# Patient Record
Sex: MALE | Race: WHITE
[De-identification: names, ages, dates, MRNs, and addresses within clinical notes are randomized per-mention and may not be internally consistent; named-entity substitution may affect disease eponyms.]

---

## 2020-08-27 NOTE — NUR
Patient arrived to [facility] this afternoon and had his initial assessment.  Patient
was very talkative.  He was aclamated to the IPR unit.  Patient is forgetful
at times.  PT asked to have ST orders placed for cognition issues.  He is
independent with his meals.  He was a CGA with all transfers using gait belt
and walker.  He was able to wipe himself and pull pants up and down
independently with Touch assistance only.  His bottom is a little red.
Patient needed Mod assist to get legs in the bed.  He is independent with
rolling side to side and sit to lying and lying to sitting.  Patient wanted
brief removed at  so that he could sleep better.  He is A&Ox4 and had a pain
level of 0-4/10 this shift.

## 2020-08-27 NOTE — NUR
Call placed to Engineering about temperture in patient room.  Patient
reporting that temp is too cold.  Awaiting a return call.

## 2020-08-28 NOTE — NUR
BED ALARM SOUNDING.  PT AMB TO BR PER SELF W/ WALKER. ENC PT TO CALL FOR
ASSIST.  RELATED HAD A BAD NIGHT.  PT FEELS CONSTIPATED. WANTED ONLY PRUNE
JUICE.  HAVING LT HIP PAIN. SEE MAR FOR PAIN MED GIVEN.  PT RELATED HAD
URINATED ON FEET. CLEANED FEET AND PLACED NEW SOCKS ON.  PT APPRECIATIVE FOR
ASSIST. CALL LIGHT IN REACH. BED ALARM SET.

## 2020-08-28 NOTE — NUR
Patient sitting up in the recliner eatting breakfast. A&Ox3. VSS. Denies pain
and discomfort. Nursing staff assisted patient prior to the bathroom. Gait
belt and walker used, patient steady on feet and tolerated well. Patient
independent with breakfast. No further needs expressed from the patient. Call
light within reach. Chair alarm on

## 2020-08-28 NOTE — NUR
met with patient, patient's wife Cindy (ph#[phone]) and
patient's daughter, Virginia (ph#[phone]) as patient is new to [facility].
Patient's daughter is here from [place]. Patient [place], [place] with his wife and sees Dr. Yen for primary care. Patient had a hip
replacement on 08/10/20 at the [facility] and was discharged
home. Patient has been using a four wheeled walker and feels that this is why
he has been falling at home. Patient does not have a standard front wheeled
walker. Patient is normally independent with ADLS. Patient does not have
Advance Directives in EMR. SW will continue to follow.

## 2020-08-29 NOTE — NUR
Patient resting in recliner, call light in reach and bed alarm set.  Denies
pain at this time.  Visiting with daughter on the phone at this time.  Will
continue to monitor.

## 2020-08-29 NOTE — NUR
Patient attended group therapy this morning.  He is independent with eating.
He is currently resting in recliner with wife and daughter by his side.
Patient given prn pain meds for leg pain and has been effective.  Dressing to
left leg is CDI.

## 2020-08-29 NOTE — NUR
PATIENT REPORTS FEELING RESTLESS AT THIS TIME, SITTING AT SIDE OF BED, UNABLE
TO SLEEP, REMINDED PATIENT OF FALL PRECAUTIONS, NEEDING TO HAVE ASST W/GB&WW
WHEN OUT OF BED. DENIES ANY NEEDS OR CONCERNS. syncope

## 2020-08-29 NOTE — NUR
PATIENT WITH STEADY GAIT WHEN WALKING TO AND FROM BATHROOM WITH ASSIST
X1/GB&WW IN PLACE. FALL PRECAUTIONS IN PLACE AND PATIENT VERBALIZED
UNDERSTANDING OF GB & WW USE WHEN OUT OF BED AND ACCOMPANIED BY STAFF X1. BED
ALARM ON WHEN IN BED.

## 2020-08-29 NOTE — NUR
PATIENT AMBULATING FROM BATHROOM TO BED DURING CHANGE OF SHIFT REPORT FROM DAY
SHIFT NURSE. BED ALARM ON WHEN BACK IN BED.

## 2020-08-29 NOTE — NUR
Family just arrived and visiting with patient.  Patient denies pain at this
time.  Takes pills whole with water.  Currently sitting in recliner, call
light in reach and chair alarm set.  Will continue to monitor.

## 2020-08-30 NOTE — NUR
PATIENT WITH SOME DECREASED ROM/STRENGTH TO LLE D/T HIP SURGERY. DENIES
NUMBNESS/TINGLING TO LLE AT THIS TIME. DENIES CHEST PAIN, SHORTNESS OF BREATH
AT THIS TIME. CHAIR ALARM ON WHEN SITTING UP IN CHAIR CURRENTLY.

## 2020-08-30 NOTE — NUR
PATIENT C/O ABDOMINAL PAIN THIS AM WHICH SUBSIDED AND RETURNED IN THE EARLY
AFTERNOON. AFTER A LATE LUNCH THE PATIENT AMBULATED THE HALLWAYS WITH THIS
NURSE. AT THIS TIME THE PATIENT RECEIVED A PRN TRAMADOL AND UTILIZED ICE
THERAPY ON THE HIP. THE PATIENT AMBULATED THE HALLWAY AGAIN LATER. IN THE
EVENING THE PATIENT REPORTED GAGGING ON PHLEGM BUT NO MORE ABDOMINLA PAIN.

## 2020-08-30 NOTE — NUR
PATIENT STATED HE WALKED IN HALLS WITH DAY SHIFT NURSES A TOTAL OF 2 TIMES,
ABOUT SAME DISTANCE HE WALKED CURRENTLY WITH THIS NURSE. UP IN CHAIR, CHAIR
ALARM ON.

## 2020-08-30 NOTE — NUR
PATIENT SLEEPING IN CHAIR AT BEDSIDE SHIFT REPORT. CHAIR ALARM IN PLACE, CALL
LIGHT WITHIN REACH. PATIENT REQUESTS A BATH AND SHAVE TODAY.

## 2020-08-30 NOTE — NUR
PATIENT C/O ABDOMINAL PAIN, DENIES FEELING OF NAUSEA, DOES FEEL CONSTIPATED.
TUMS WAS GIVEN THIS AM. PATIENT DENIED MORE PAIN UNTIL THE AFTERNOON.
CONTINUING TO MONITOR.

## 2020-08-30 NOTE — NUR
RECEIVED CHANGE OF SHIFT REPORT FROM DAY SHIFT NURSE, WITH PATIENT SITTING UP
IN CHAIR. DENIES ANY NEEDS OR CONCERNS AT TIME OF REPORT.

## 2020-08-30 NOTE — NUR
CHANGE OF SHIFT REPORT GIVEN TO DAY SHIFT NURSEMIRNA. CHAIR ALARM ON WHILE
PATIENT SLEEPING IN CHAIR DURING REPORT.

## 2020-08-30 NOTE — NUR
PATIENT AMBULATED 300 FEET TODAY IN TWO DIFFERNT SESSIONS WITH THIS NURSE.
GAIT BELT AND WALKER UTILIZED. PATIENT PUTS A LOT OF WEIGHT ON WRIST/HANDS
LEANING ON THE WALKER FOR SUPPORT.

## 2020-08-31 NOTE — NUR
PATIENT RESTING IN CHAIR AT BEDSIDE SHIFT REPORT. PATIENT'S CLOTHES BROUGHT UP
FROM DRYER. CHAIR ALARM ON AND CALL LIGHT WITHIN REACH.

## 2020-08-31 NOTE — NUR
PRN SUPPOSITORY GIVEN TODAY FOR CONSTIPATION. PRN MIRALAX GIVEN WITH PRUNE
JUICE YESTERDAY AND PATIENT RECEIVES SCHEDULED COLACE. PATIENT HAD SMALL HARD
STOOL TODAY AND C/O NEEDING TO GO MORE.

## 2020-08-31 NOTE — NUR
PATIENT SHOWERED WITH OT, THE AIRSTRIP NEEDED CHANGED AND HAD MOD AMOUNT OF
SANGUINEOUS DRAINAGE NOTED. A SINGLE SUTURE CAME OUT ON POSTERIOR END OF
INCISION AND INCISION DOES NOT APPROXIMATE AT THAT SITE.

## 2020-08-31 NOTE — NUR
PATIENT WITH DECREASED STRENGTH TO LLE D/T RECENT HIP SURGERY AND PAIN. DENIES
CHEST PAIN, SHORTNESS OF BREATHE, NUMBNESS/TINGLING TO EXTREMITIES. CHAIR
ALARM ON WHEN UP IN CHAIR, BED ALARM ON WHEN IN BED.

## 2020-08-31 NOTE — NUR
PATIENT DROPPED ON THE FLOOR EARLY AM SCHEDULED MEDS OF PROTONIX, IMDUR AND
THYROID MEDS, AFTER PATIENT PLACED PILLS FROM PILL CUP INTO HIS HAND. REMOVED
SAME THREE MEDS FROM MED STATION. PATIENT UP IN CHAIR WITH CHAIR ALARM ON.

## 2020-08-31 NOTE — NUR
PATIENT HAD SMALL FORMED SOFT BLACK/TARRY BM AFTER SUPPOSITORY THIS DANISHA. FEELS
LIKE HE STILL HAS MORE. GIVING PATIENT A BREAK AND WILL TRY OTHER
INTERVENTIONS IF NECESSARY. PATIENT'S PAIN MANAGED WELL DURING THERAPY TODAY.
PATIENT RESTING IN CHAIR WITH ALARM IN PLACE AND ON AND CALL LIGHT WITHIN
REACH.

## 2020-08-31 NOTE — NUR
KOLBY met with the patient and his daughter, Virginia, to introduce oneself and
to follow up. The patient reports that he is okay. KOLBY discussed having a
patient/family meeting this Wednesday. Laly contacted the patient's wife,
Cindy, to see if this would work. Cindy reports that she has appointments
all day on Wednesday and that Thursday would work better for her. A
patient/family meeting was scheduled for Thursday at 1315. KOLBY to inform IPR
Director.

## 2020-09-01 NOTE — NUR
Patient was seen by Dr. Leach today, see new orders for Melatonin to help him
with sleeping.  Patient's wife and daughter stopped by to visit with him
earlier today.

## 2020-09-01 NOTE — NUR
Patient attended group therapy and is now resting in recliner, call light in
reach and chair alarm set.  Patient reported that he had a large bowel
movement following Group Therapy.  Patient had received some miralax this
morning and had some warm water with prunes with breakfast.  Patient reporting
that he is feeling more comfortable now.

## 2020-09-01 NOTE — NUR
Admission QIM scores were reviewed by the team.  Code of 2 chosen for lower
body dressing was determined by team discussion to be the most usual
performance for this patient during the assessment period.--Kiersten Brown, PD

## 2020-09-01 NOTE — NUR
CHANGE OF SHIFT REPORT RECEIVED FROM DAY SHIFT NURSE. PATIENT UP IN CHAIR
DURING REPORT. CHAIR ALARM ON.

## 2020-09-01 NOTE — NUR
KOLBY contacted the patient's daughter, Virginia, to review how only one visitor
will be allowed for the patient/family meeting or how the team can contact
both her and her mother for a conference call. Virginia reports that she would
like to speak to her mother and the patient about this, before deciding on how
to do the meeting. She reports she will contact KOLBY tomorrow morning. KOLBY to
continue to follow.

## 2020-09-02 NOTE — NUR
KOLBY met with the patient to present and review the IPR Team Conference Note. KOLBY
reviewed the patient's progress and the team's recommendation for d/c this
Friday, 9/4, with home health PT/OT vs outpatient PT. The patient is agreeable
to the plan. He states that he would prefer outpatient PT, if he is able to
drive. KOLBY then contacted and reviewed the above information with the patient's
daughter (Virginia) and wife Cindy). Virginia and Cindy were agreeable to
the plan. Virginia plans to bring Cindy up to the hospital tomorrow for the
family meeting and asks that we call her, so she be apart of the meeting. SW
to continue to follow.

## 2020-09-02 NOTE — NUR
Received report from NAZ Walter. Pt is currently sitting up in his chair. Pt
has his call light wtihin reach and his bed is in lowest position.

## 2020-09-02 NOTE — NUR
Patient currently working with therapy.  Tolerated diet well this AM.  Denies
questions this morning.

## 2020-09-02 NOTE — NUR
Pt ambulated around the surgical nurse station and back to [facility] and to his
room. Pt tolerated the walk very well. Pt had no complaints of pain. Pt is
currently back in his chair and has his call light within reach. He did
request that he had something for pain whenever he got his night time
medications.

## 2020-09-03 NOTE — NUR
Pt ambulated around the surgical nurse station and back to his room. Pt
tolerated well. Pt did stated that he was having some pain so pain was given
his pain medication with his night time medications. Pt ate a few crackers so
that he wouldn't take his medication on an empty stomach. Pt lung sounds were
clear and heart sound was normal S1 and S2 sounds. Pt has his call light
within reach. He is independent in his room has no other concerns at this
time. Pt was given fresh ice water and sugar free hot chocolate as requested.

## 2020-09-03 NOTE — NUR
PATIENT INDEPENDENT IN THE ROOM THROUGHOUT MY SHIFT. PATIENT GIVEN PAIN
MEDICATION FOR LEFT HIP PAIN AS NEEDED DURING THE DAY. WILL REPORT OFF TO
ONCOMING NURSE.

## 2020-09-03 NOTE — NUR
PATIENT TOOK SHOWER WITH OT. AIRSTRIP DRESSING REMOVED. SCANT OLD BLOODY
DRAINAGE PRESENT ON DRESSING TO PROXIMAL AND DISTAL ENDS OF THE INCISION.
EDGES WELL APPROXIMATED. STERI-STRIPS IN PLACE TO PORTIONS OF INCISION.
REDNESS AND EDEMA TO LEFT HIP SURROUNDING INCISION SITE. NEW AIRSTRIP DRESSING
APPLIED OVER LEFT HIP INCISION. PATIENT TOLERATED WELL.

## 2020-09-03 NOTE — NUR
KOLBY attended a family meeting with patient, his wife, and daughter (Virginia).
Also present was IPR Director, PT, OT, and ST. IPR Director started by
explaining the purpose of the meeting. PT/OT/ST discussed the patient's
progress and their recommendation for a d/c tomorrow with home health PT/OT vs
outpatient PT. The patient's daughter reports that the patient was cleared to
drive and that she can also accompany the patient to the appointments. The
patient reports that he would prefer outpatient PT and that he has already
spoken to [facility] and has an outpatient PT appointment.
KOLBY contacted [facility] at [facility] and confirmed the patient
has an outpatient PT appointment scheduled on 9/8 at 1020. KOLBY to inform the
patient's RN. The team answered all of patient and families questions.
 
AGATA delivered the patient's FWW to his room. KOLBY presented and read the IM
form outloud to the patient. The patient verbalized understanding and gave KOLBY
approval to sign the form on his behalf. KOLBY provided him with a copy. KOLBY to
continue to follow.

## 2020-09-03 NOTE — NUR
Pt awake and sitting up in bed. Pt request hot chocolate this morning and pt
was given sugar free hot chocolate this morning. Pt stated that he slept
better last night. Pt has his call light within reach and his bed is in lowest
position.

## 2020-09-04 NOTE — NUR
Pt is currently sleeping in his bed. Pt has been up a couple of times to go to
the restroom during the night. Pt also walked to his door earlier just to let
me know that he was awake. I did check on him a few times and he was reading
his bible on his phone. Pt has his call light within reach and his bed is in
lowest position.

## 2020-09-04 NOTE — NUR
PATIENT RESTING IN CHAIR AT BEDSIDE SHIFT REPORT. CALL LIGHT WITHIN REACH,
PATIENT DENIES PAIN AT THIS TIME.

## 2020-09-04 NOTE — NUR
Reported off to NAZ Lama. Pt is currently sitting up in his chair. He has
his call light within reach and has no concerns at this time.

## 2020-09-04 NOTE — NUR
Patient healthy summary, discharge summary, and home meds printed and reviewed
with patient and wife Cindy. Stressed importance of follow up appointments.
Reviewed medications. provided printed prescription for Tramadol. Call
prescriptions for Levothyroxine to [facility]. Belongings gathered
by Cna and family including watch and cellphone. Pt transported via wc by cna
and nurse and seatbelted for ride home. Patient and family denied questions.

## 2020-10-27 NOTE — NUR
3-4 specimans obtained from site, specimans put in 3cc of Sterile NS as
directed by Lab for container. bandaid over site, pt has no c/o

## 2021-02-06 NOTE — NUR
PT IN FOR IV MEDICATION. PICC WNL WITH GOOD BLOOD RETURN. AFTER MEDICATION
GIVEN PT ESCORTED TO PRIVATE VEHICLE VIA WHEELCHAIR.

## 2021-02-18 NOTE — NUR
Pt assisted out by wheelchair to entrance, then accompanied to car as he
ambulates with walker. Dressing to RAMESH remains clean, dry and intact. Pt
expresses understanding of PICC removal DC instructions.

## 2021-04-08 NOTE — NUR
No blood return today via PICC.Reported to ALLNE Anderson.Pt has apt with her
following this apt.Requested orders for cath flow after checking PICC
placement tomorrow .Daysi aware,per Daysi limon to use for IV abx.Will reassess
tomorrow.

## 2021-04-09 NOTE — NUR
Blood return observed via picc after 30 minutes instill time of cathflow.20ml
of blood aspired from PICC,flushed with NS.

## 2021-04-14 NOTE — NUR
Per pt report he seen surgeon yesterday,reports they "drained it".New dressing
observed at site.Pt to see ALLEN Anderson wound nurse tomorrow for follow up.No
neck dressing change today.

## 2021-04-29 NOTE — NUR
Pt returned after ID apt for PICC removal.Pt waited 30 minutes post picc
removal.No bleeding at site observed.Pt assisted to car via wheelchair.

## 2022-06-08 ENCOUNTER — HOSPITAL ENCOUNTER (OUTPATIENT)
Dept: HOSPITAL 19 - ZLAB.STJ | Age: 87
End: 2022-06-08
Attending: INTERNAL MEDICINE
Payer: MEDICARE

## 2022-06-08 DIAGNOSIS — E87.1: Primary | ICD-10-CM

## 2022-06-08 DIAGNOSIS — M62.81: ICD-10-CM

## 2022-06-17 ENCOUNTER — HOSPITAL ENCOUNTER (INPATIENT)
Dept: HOSPITAL 19 - COL.ER | Age: 87
LOS: 6 days | Discharge: NURSING, REHAB OR LONG-TERM CARE | DRG: 193 | End: 2022-06-23
Attending: INTERNAL MEDICINE | Admitting: INTERNAL MEDICINE
Payer: COMMERCIAL

## 2022-06-17 VITALS — BODY MASS INDEX: 27.53 KG/M2 | WEIGHT: 181.66 LBS | HEIGHT: 67.99 IN

## 2022-06-17 DIAGNOSIS — I48.91: ICD-10-CM

## 2022-06-17 DIAGNOSIS — Z95.5: ICD-10-CM

## 2022-06-17 DIAGNOSIS — K21.9: ICD-10-CM
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
## 2022-06-17 DIAGNOSIS — E11.649: ICD-10-CM

## 2022-06-17 DIAGNOSIS — E80.6: ICD-10-CM

## 2022-06-17 DIAGNOSIS — E87.2: ICD-10-CM

## 2022-06-17 DIAGNOSIS — Z95.1: ICD-10-CM

## 2022-06-17 DIAGNOSIS — Z23: ICD-10-CM

## 2022-06-17 DIAGNOSIS — Z95.0: ICD-10-CM

## 2022-06-17 DIAGNOSIS — T38.0X5A: ICD-10-CM

## 2022-06-17 DIAGNOSIS — I27.20: ICD-10-CM

## 2022-06-17 DIAGNOSIS — I21.A1: ICD-10-CM

## 2022-06-17 DIAGNOSIS — B97.89: ICD-10-CM

## 2022-06-17 DIAGNOSIS — Z87.01: ICD-10-CM

## 2022-06-17 DIAGNOSIS — Z79.82: ICD-10-CM

## 2022-06-17 DIAGNOSIS — J96.01: ICD-10-CM

## 2022-06-17 DIAGNOSIS — D72.829: ICD-10-CM

## 2022-06-17 DIAGNOSIS — I50.23: ICD-10-CM

## 2022-06-17 DIAGNOSIS — E11.65: ICD-10-CM

## 2022-06-17 DIAGNOSIS — J18.1: Primary | ICD-10-CM

## 2022-06-17 DIAGNOSIS — Z88.8: ICD-10-CM

## 2022-06-17 DIAGNOSIS — B97.19: ICD-10-CM

## 2022-06-17 DIAGNOSIS — Z79.890: ICD-10-CM

## 2022-06-17 DIAGNOSIS — Z88.2: ICD-10-CM

## 2022-06-17 DIAGNOSIS — Z20.822: ICD-10-CM

## 2022-06-17 DIAGNOSIS — I08.0: ICD-10-CM

## 2022-06-17 DIAGNOSIS — N17.9: ICD-10-CM

## 2022-06-17 DIAGNOSIS — D64.9: ICD-10-CM

## 2022-06-17 DIAGNOSIS — I25.10: ICD-10-CM

## 2022-06-17 DIAGNOSIS — I11.0: ICD-10-CM

## 2022-06-17 DIAGNOSIS — Z79.84: ICD-10-CM

## 2022-06-17 LAB
ALBUMIN SERPL-MCNC: 2.9 GM/DL (ref 3.4–4.8)
ALP SERPL-CCNC: 58 U/L (ref 40–150)
ALT SERPL-CCNC: 55 U/L (ref 0–55)
ANION GAP SERPL CALC-SCNC: 15 MMOL/L (ref 7–16)
AST SERPL-CCNC: 67 U/L (ref 5–34)
BASE EXCESS BLDA CALC-SCNC: 0 MMOL/L (ref -2–2)
BILIRUB SERPL-MCNC: 1.5 MG/DL (ref 0.2–1.2)
BUN SERPL-MCNC: 32 MG/DL (ref 8–26)
CALCIUM SERPL-MCNC: 8.7 MG/DL (ref 8.4–10.2)
CHLORIDE SERPL-SCNC: 102 MMOL/L (ref 98–107)
CO2 BLDA-SCNC: 24.5 MMOL/L
CO2 SERPL-SCNC: 21 MMOL/L (ref 23–31)
CREAT SERPL-SCNC: 0.98 MG/DL (ref 0.72–1.25)
EOSINOPHIL NFR BLD: 1 % (ref 0–4)
ERYTHROCYTE [DISTWIDTH] IN BLOOD BY AUTOMATED COUNT: 13.7 % (ref 11.5–14.5)
GLUCOSE SERPL-MCNC: 196 MG/DL (ref 70–99)
HCO3 BLDA-SCNC: 23.5 MEQ/L (ref 22–26)
HCT VFR BLD AUTO: 37.5 % (ref 42–52)
HGB BLD-MCNC: 13 G/DL (ref 13.5–18)
LIPASE SERPL-CCNC: 7 U/L (ref 8–78)
LYMPHOCYTES NFR BLD MANUAL: 6 % (ref 20–51)
MCH RBC QN AUTO: 34 PG (ref 27–31)
MCHC RBC AUTO-ENTMCNC: 35 G/DL (ref 33–37)
MCV RBC AUTO: 97 FL (ref 80–100)
METAMYELOCYTES NFR BLD MANUAL: 1 % (ref 0–0)
MONOCYTES NFR BLD: 6 % (ref 1.7–9.3)
NEUTS BAND NFR BLD: 4 % (ref 0–10)
NEUTS SEG NFR BLD MANUAL: 82 % (ref 42–75.2)
PCO2 BLDA: 34.3 MMHG (ref 35–45)
PLATELET # BLD AUTO: 135 K/MM3 (ref 130–400)
PLATELET BLD QL SMEAR: NORMAL
PMV BLD AUTO: 10.5 FL (ref 7.4–10.4)
PO2 BLDA: 79.3 MMHG (ref 80–100)
POTASSIUM SERPL-SCNC: 4.3 MMOL/L (ref 3.5–4.5)
PROT SERPL-MCNC: 6.6 GM/DL (ref 6.2–8.1)
RBC # BLD AUTO: 3.87 M/MM3 (ref 4.2–5.6)
SAO2 % BLDA: 95.6 % (ref 92–100)
SODIUM SERPL-SCNC: 138 MMOL/L (ref 136–145)
TROPONIN I SERPL-MCNC: 0.04 NG/ML (ref 0–0.03)

## 2022-06-17 PROCEDURE — C1751 CATH, INF, PER/CENT/MIDLINE: HCPCS

## 2022-06-17 PROCEDURE — A9284 NON-ELECTRONIC SPIROMETER: HCPCS

## 2022-06-18 VITALS — DIASTOLIC BLOOD PRESSURE: 71 MMHG | TEMPERATURE: 97.4 F | HEART RATE: 73 BPM | SYSTOLIC BLOOD PRESSURE: 137 MMHG

## 2022-06-18 VITALS — SYSTOLIC BLOOD PRESSURE: 167 MMHG | TEMPERATURE: 98.5 F | DIASTOLIC BLOOD PRESSURE: 79 MMHG | HEART RATE: 69 BPM

## 2022-06-18 VITALS — DIASTOLIC BLOOD PRESSURE: 81 MMHG | TEMPERATURE: 97.7 F | SYSTOLIC BLOOD PRESSURE: 152 MMHG | HEART RATE: 74 BPM

## 2022-06-18 VITALS — SYSTOLIC BLOOD PRESSURE: 151 MMHG | DIASTOLIC BLOOD PRESSURE: 78 MMHG | HEART RATE: 70 BPM | TEMPERATURE: 98.7 F

## 2022-06-18 VITALS — HEART RATE: 73 BPM | DIASTOLIC BLOOD PRESSURE: 75 MMHG | SYSTOLIC BLOOD PRESSURE: 144 MMHG | TEMPERATURE: 97.6 F

## 2022-06-18 VITALS — DIASTOLIC BLOOD PRESSURE: 68 MMHG | HEART RATE: 70 BPM | SYSTOLIC BLOOD PRESSURE: 133 MMHG | TEMPERATURE: 98.1 F

## 2022-06-18 VITALS — TEMPERATURE: 97.8 F | SYSTOLIC BLOOD PRESSURE: 135 MMHG | DIASTOLIC BLOOD PRESSURE: 65 MMHG | HEART RATE: 70 BPM

## 2022-06-18 NOTE — NUR
Theresa met with the pt to complete intake. Pt lives at home with his wife, Edel
[phone]. Pt reports he is independent on all ADLs but does use a motorize
scooter, walker, and cane. [facility].  Pt
daughter is in town helping out, as his wife has knee surgery on the 23rd of
june. Pt reports he has DPOA-HC and does not need a paperwork.
PT is monitoring.
 
DC: Rehab vs home.

## 2022-06-18 NOTE — NUR
Pt alert and oriented, resting quietly, follows commands. Pt appears drowsy
and falls asleep quickly, but is alert to speech. Pt states he has been
feeling very weak and has had "many falls" in the past few months d/t
weakness. Denies chest pain/SOB/abdominal pain currently. Reports he
fluctuates between diarrhea and constipation. Intake assessment and admission
assessments performed. Allx confirmed. Med rx completed and reviewed. COVID
and infectious disease assessments performed. Isolation precautions enforced
until respiratory virus panel results. VS stable. BP is elevated. HR NSR,
paced with external pacer. Currently satting WNL on 3L NC, was on 6L oxymask
in ED. No significant skin issues noted. Scattered bruising noted on BUE/BLE.
Pt provided with nourishment. Patten catheter noted, placed in ED. Adequate
urine output noted. Pt does not report any questions at this time, will
continue to monitor.

## 2022-06-18 NOTE — NUR
88 yo male admitted for further care and management of acute respiratory
failure and sepsis of likely pulmonary source.
ht 172.7 cm  wt 82.4 kg  SCr 0.98 with estimated CrCl ~45 ml/min  half life
19.2 hours
 
Plan:
Will give an initial loading dose of vancomycin 1500 mg x1 (18.2 mg/kg);
followed by a maintenance regimen of vancomycin 1250 mg q18h to target a goal
trough of 15-20 mcg/ml.
Will follow patient's renal function, micro data, and vancomycin levels as
indicated to assess for any necessary changes to regimen.
Thank you for this consult.

## 2022-06-18 NOTE — NUR
No adverse events overnight. Pt alert and oriented, still drowsy, but follows
commands and is alert to speech. Up with one assist to bathroom. Patten
catheter remains in place with adequate urine output. Droplet isolation
precautions are enforced. Pt tolerated medications per orders with water.
Medication education provided. Pt continues with IV antibx and IV steroids.
VS stable, BP runs elevated. Pt satting WNL on 3L NC. Pt resting quietly, does
not report any questions at this time. Will continue to monitor.

## 2022-06-18 NOTE — NUR
REPORTED CRITICAL TROPONIN LAB OF 0.039 TO ALLEN BROWNE. GIVEN VERBAL
PHONE ORDER TO PLACE ANOTHER ORDER FOR A TROPONIN LAB AT 0500.

## 2022-06-19 VITALS — HEART RATE: 93 BPM | SYSTOLIC BLOOD PRESSURE: 152 MMHG | TEMPERATURE: 97.4 F | DIASTOLIC BLOOD PRESSURE: 72 MMHG

## 2022-06-19 VITALS — HEART RATE: 73 BPM | DIASTOLIC BLOOD PRESSURE: 67 MMHG | TEMPERATURE: 97.7 F | SYSTOLIC BLOOD PRESSURE: 124 MMHG

## 2022-06-19 VITALS — TEMPERATURE: 97.5 F | HEART RATE: 74 BPM | SYSTOLIC BLOOD PRESSURE: 111 MMHG | DIASTOLIC BLOOD PRESSURE: 60 MMHG

## 2022-06-19 VITALS — DIASTOLIC BLOOD PRESSURE: 63 MMHG | TEMPERATURE: 98.7 F | HEART RATE: 68 BPM | SYSTOLIC BLOOD PRESSURE: 134 MMHG

## 2022-06-19 VITALS — HEART RATE: 100 BPM | DIASTOLIC BLOOD PRESSURE: 69 MMHG | TEMPERATURE: 97.7 F | SYSTOLIC BLOOD PRESSURE: 113 MMHG

## 2022-06-19 VITALS — SYSTOLIC BLOOD PRESSURE: 120 MMHG | HEART RATE: 70 BPM | DIASTOLIC BLOOD PRESSURE: 83 MMHG | TEMPERATURE: 98 F

## 2022-06-19 LAB
ALBUMIN SERPL-MCNC: 2.4 GM/DL (ref 3.4–4.8)
ANION GAP SERPL CALC-SCNC: 11 MMOL/L (ref 7–16)
BASOPHILS # BLD: 0 K/MM3 (ref 0–0.2)
BASOPHILS NFR BLD AUTO: 0.1 % (ref 0–2)
BUN SERPL-MCNC: 34 MG/DL (ref 8–26)
CALCIUM SERPL-MCNC: 8.3 MG/DL (ref 8.4–10.2)
CHLORIDE SERPL-SCNC: 102 MMOL/L (ref 98–107)
CO2 SERPL-SCNC: 24 MMOL/L (ref 23–31)
CREAT SERPL-SCNC: 0.95 MG/DL (ref 0.72–1.25)
EOSINOPHIL # BLD: 0 K/MM3 (ref 0–0.7)
EOSINOPHIL NFR BLD: 0 % (ref 0–4)
ERYTHROCYTE [DISTWIDTH] IN BLOOD BY AUTOMATED COUNT: 13.5 % (ref 11.5–14.5)
GLUCOSE SERPL-MCNC: 198 MG/DL (ref 70–99)
GRANULOCYTES # BLD AUTO: 89.2 % (ref 42.2–75.2)
HCT VFR BLD AUTO: 34.7 % (ref 42–52)
HGB BLD-MCNC: 11.7 G/DL (ref 13.5–18)
LYMPHOCYTES # BLD: 0.5 K/MM3 (ref 1.2–3.4)
LYMPHOCYTES NFR BLD: 4.5 % (ref 20–51)
MAGNESIUM SERPL-MCNC: 1.8 MG/DL (ref 1.6–2.6)
MCH RBC QN AUTO: 33 PG (ref 27–31)
MCHC RBC AUTO-ENTMCNC: 34 G/DL (ref 33–37)
MCV RBC AUTO: 99 FL (ref 80–100)
MONOCYTES # BLD: 0.6 K/MM3 (ref 0.1–0.6)
MONOCYTES NFR BLD AUTO: 5.2 % (ref 1.7–9.3)
NEUTROPHILS # BLD: 10.3 K/MM3 (ref 1.4–6.5)
PHOSPHATE SERPL-MCNC: 3.1 MG/DL (ref 2.3–4.7)
PLATELET # BLD AUTO: 134 K/MM3 (ref 130–400)
PMV BLD AUTO: 10.3 FL (ref 7.4–10.4)
POTASSIUM SERPL-SCNC: 3.5 MMOL/L (ref 3.5–4.5)
RBC # BLD AUTO: 3.51 M/MM3 (ref 4.2–5.6)
SODIUM SERPL-SCNC: 137 MMOL/L (ref 136–145)

## 2022-06-19 NOTE — NUR
Initial shift assessment done-  sitting at edge of bed, relaxing, no requests,
Alert/Oriented, Tele on 80/min paced, o2 reduced to 2L/nc from 3L/nc
at this time by
respiratory therapy, Lung sounds decreased in the bases, denies pain/SOB-
using call light appropriately, bed alarm on.

## 2022-06-19 NOTE — NUR
Patient has been stable along the day, receiving all his antibiotics and
eating well. No sputum production. Report will be given to night RN.

## 2022-06-19 NOTE — NUR
Patient is resting in bed, alert and oriented x 4, VSS. Telemetry in place,
RH 80'S. TEDS in place, BLE edema.  2 1/2 L O2 NC. Catheter tellez in place,
clear yellow urine. Assessment completed, meds provided, no other needs at
this time. Call light within reach.

## 2022-06-19 NOTE — NUR
pt on 2.5L O2 per NC this shift, alert and oriented x3, but forgetful @times.
SSI given @geoff patent and secure. new IV placed per NAZ Harris during the
noc. tramadol requested x1 for discomfort, able to sleep afterwards

## 2022-06-20 VITALS — TEMPERATURE: 97.5 F | DIASTOLIC BLOOD PRESSURE: 73 MMHG | HEART RATE: 76 BPM | SYSTOLIC BLOOD PRESSURE: 149 MMHG

## 2022-06-20 VITALS — TEMPERATURE: 98 F | HEART RATE: 80 BPM | SYSTOLIC BLOOD PRESSURE: 142 MMHG | DIASTOLIC BLOOD PRESSURE: 72 MMHG

## 2022-06-20 VITALS — DIASTOLIC BLOOD PRESSURE: 83 MMHG | HEART RATE: 91 BPM | SYSTOLIC BLOOD PRESSURE: 137 MMHG | TEMPERATURE: 98 F

## 2022-06-20 VITALS — TEMPERATURE: 97.9 F | HEART RATE: 77 BPM | DIASTOLIC BLOOD PRESSURE: 86 MMHG | SYSTOLIC BLOOD PRESSURE: 134 MMHG

## 2022-06-20 VITALS — DIASTOLIC BLOOD PRESSURE: 73 MMHG | HEART RATE: 70 BPM | SYSTOLIC BLOOD PRESSURE: 168 MMHG | TEMPERATURE: 97.6 F

## 2022-06-20 VITALS — HEART RATE: 71 BPM | SYSTOLIC BLOOD PRESSURE: 153 MMHG | DIASTOLIC BLOOD PRESSURE: 70 MMHG | TEMPERATURE: 98.1 F

## 2022-06-20 LAB
ALBUMIN SERPL-MCNC: 2.7 GM/DL (ref 3.4–4.8)
ANION GAP SERPL CALC-SCNC: 14 MMOL/L (ref 7–16)
BUN SERPL-MCNC: 43 MG/DL (ref 8–26)
CALCIUM SERPL-MCNC: 8.5 MG/DL (ref 8.4–10.2)
CHLORIDE SERPL-SCNC: 101 MMOL/L (ref 98–107)
CO2 SERPL-SCNC: 23 MMOL/L (ref 23–31)
CREAT SERPL-SCNC: 1.26 MG/DL (ref 0.72–1.25)
ERYTHROCYTE [DISTWIDTH] IN BLOOD BY AUTOMATED COUNT: 13.7 % (ref 11.5–14.5)
GLUCOSE SERPL-MCNC: 319 MG/DL (ref 70–99)
HCT VFR BLD AUTO: 36.7 % (ref 42–52)
HGB BLD-MCNC: 12.5 G/DL (ref 13.5–18)
LYMPHOCYTES NFR BLD MANUAL: 6 % (ref 20–51)
MAGNESIUM SERPL-MCNC: 1.9 MG/DL (ref 1.6–2.6)
MCH RBC QN AUTO: 34 PG (ref 27–31)
MCHC RBC AUTO-ENTMCNC: 34 G/DL (ref 33–37)
MCV RBC AUTO: 99 FL (ref 80–100)
MONOCYTES NFR BLD: 2 % (ref 1.7–9.3)
NEUTS BAND NFR BLD: 9 % (ref 0–10)
NEUTS SEG NFR BLD MANUAL: 83 % (ref 42–75.2)
PHOSPHATE SERPL-MCNC: 2.7 MG/DL (ref 2.3–4.7)
PLATELET # BLD AUTO: 162 K/MM3 (ref 130–400)
PLATELET BLD QL SMEAR: NORMAL
PMV BLD AUTO: 10.1 FL (ref 7.4–10.4)
POTASSIUM SERPL-SCNC: 3.5 MMOL/L (ref 3.5–4.5)
RBC # BLD AUTO: 3.72 M/MM3 (ref 4.2–5.6)
SODIUM SERPL-SCNC: 138 MMOL/L (ref 136–145)

## 2022-06-20 NOTE — NUR
Patient has had all his meals, continues with 2L O2, and getting his
antibiotics. He removed his IV, hygiene provided, gown and linens changed.
Report given to edwin CHILDS.

## 2022-06-20 NOTE — NUR
Initial shift assessment done- new INT started to right wrist area 22g,,
sitting at edge of bed, no requests, alert/overall oriented x4- pleasant,
states he is still very weak, o2 at 3L/nc - sats 91%, Tele on-paced 76/min,
bed alalrm on- close to nursing station-

## 2022-06-20 NOTE — NUR
Patten has been leaking consistently -did deflate balloon [8cc], added an
additional 2 cc to balloon-- initially seemed to help with leaking but did not
last-- Rachell NP called, order to just dc Patten at this time- pt did states
he had some burning with the leaking- Rachell aware- will see how he does with
burning after the catheter is removed-

## 2022-06-20 NOTE — NUR
Patient came back to the unit right now, alert and oriented, startng
monitoring vital signs. radial band in right side. Continue monitoring.

## 2022-06-20 NOTE — NUR
Patient is resting in bed, alert and partially oriented. Telemetry in place,
afib. TEDs placed. Continue receiving antibiotics. Assessment completed, no
other needs at this time. Call light within reach, bed alarm on.

## 2022-06-20 NOTE — NUR
met with patient to discuss discharge plan and patient states he
does not have a plan at this time. Patient isn't sure what he wants to do and
invited SW to contact his wife and daughter to discuss SNF vs Home Health. KOLBY
contacted patient's daughter, Virginia and she put SW on speakerphone with her
and patient's wife, Cindy. Virginia advised patient may need SNF depending on
DC date and was open to having referrals sent to [facility] and [facility]. Patient
was recently at [facility] and Virginia advised this is where his sister lives. KOLBY
contacted both facilities and faxed referral.
 
Discharge Plan: Possible SNF, referrals pending

## 2022-06-20 NOTE — NUR
R/FA IV site infiltrated- dc,d at this time, warm compress applied- new INT
site started to left wrist 22g.

## 2022-06-20 NOTE — NUR
Did not sleep much last night-- has urinated 4-5 times since Patten was dc,d
during the night- no complaints of burning. Morning CXR has been completed.
RFA IV site edema is much improved-

## 2022-06-21 VITALS — HEART RATE: 78 BPM | TEMPERATURE: 98.2 F | DIASTOLIC BLOOD PRESSURE: 50 MMHG | SYSTOLIC BLOOD PRESSURE: 98 MMHG

## 2022-06-21 VITALS — HEART RATE: 73 BPM | TEMPERATURE: 97.6 F | DIASTOLIC BLOOD PRESSURE: 68 MMHG | SYSTOLIC BLOOD PRESSURE: 137 MMHG

## 2022-06-21 VITALS — TEMPERATURE: 97.7 F | SYSTOLIC BLOOD PRESSURE: 140 MMHG | HEART RATE: 85 BPM | DIASTOLIC BLOOD PRESSURE: 71 MMHG

## 2022-06-21 VITALS — TEMPERATURE: 98 F | HEART RATE: 78 BPM | DIASTOLIC BLOOD PRESSURE: 62 MMHG | SYSTOLIC BLOOD PRESSURE: 140 MMHG

## 2022-06-21 VITALS — TEMPERATURE: 97.8 F | SYSTOLIC BLOOD PRESSURE: 144 MMHG | DIASTOLIC BLOOD PRESSURE: 75 MMHG | HEART RATE: 70 BPM

## 2022-06-21 LAB
ALBUMIN SERPL-MCNC: 2.3 GM/DL (ref 3.4–4.8)
ANION GAP SERPL CALC-SCNC: 9 MMOL/L (ref 7–16)
BASOPHILS # BLD: 0 K/MM3 (ref 0–0.2)
BASOPHILS NFR BLD AUTO: 0.2 % (ref 0–2)
BUN SERPL-MCNC: 39 MG/DL (ref 8–26)
CALCIUM SERPL-MCNC: 8.2 MG/DL (ref 8.4–10.2)
CHLORIDE SERPL-SCNC: 105 MMOL/L (ref 98–107)
CO2 SERPL-SCNC: 26 MMOL/L (ref 23–31)
CREAT SERPL-SCNC: 0.82 MG/DL (ref 0.72–1.25)
EOSINOPHIL # BLD: 0 K/MM3 (ref 0–0.7)
EOSINOPHIL NFR BLD: 0 % (ref 0–4)
ERYTHROCYTE [DISTWIDTH] IN BLOOD BY AUTOMATED COUNT: 13.7 % (ref 11.5–14.5)
GLUCOSE SERPL-MCNC: 48 MG/DL (ref 70–99)
GRANULOCYTES # BLD AUTO: 82.5 % (ref 42.2–75.2)
HCT VFR BLD AUTO: 33.7 % (ref 42–52)
HGB BLD-MCNC: 11.7 G/DL (ref 13.5–18)
LYMPHOCYTES # BLD: 0.9 K/MM3 (ref 1.2–3.4)
LYMPHOCYTES NFR BLD: 7.6 % (ref 20–51)
MAGNESIUM SERPL-MCNC: 2 MG/DL (ref 1.6–2.6)
MCH RBC QN AUTO: 33 PG (ref 27–31)
MCHC RBC AUTO-ENTMCNC: 35 G/DL (ref 33–37)
MCV RBC AUTO: 96 FL (ref 80–100)
MONOCYTES # BLD: 1 K/MM3 (ref 0.1–0.6)
MONOCYTES NFR BLD AUTO: 8.5 % (ref 1.7–9.3)
NEUTROPHILS # BLD: 10 K/MM3 (ref 1.4–6.5)
PHOSPHATE SERPL-MCNC: 2.5 MG/DL (ref 2.3–4.7)
PLATELET # BLD AUTO: 149 K/MM3 (ref 130–400)
PMV BLD AUTO: 9.9 FL (ref 7.4–10.4)
POTASSIUM SERPL-SCNC: 3.7 MMOL/L (ref 3.5–4.5)
RBC # BLD AUTO: 3.52 M/MM3 (ref 4.2–5.6)
SODIUM SERPL-SCNC: 140 MMOL/L (ref 136–145)

## 2022-06-21 NOTE — NUR
Patient has had a calm day. He continues getting antibiotics. He is at 3L O2
NC. Report given to night RN.

## 2022-06-21 NOTE — NUR
Pt did get some sleep last night- VSS, o2 sats 94% on the 3L/nc, using his IS
during the night,,was up to bathroom with walker/assist x2 to void--

## 2022-06-21 NOTE — NUR
Patient is sitting in chair, alert and oriented x 3, VSS, Telemetry in place,
afib, HR WNL. Starting to walking more with assisstance. Continue receiving
medications. O2 NC 2.5 L. Assessment completed, meds provided. No other needs
at this time. Call light within reach.

## 2022-06-21 NOTE — NUR
spoke with Cory at [facility] who advised they can accept as long as
patient and family understand that they have covid positives in their building
and patient may be in the same court as the positives.
 
KOLBY contacted patient's daughter, Virginia to provide update. Virginia is
agreeable to placement at [facility] and was already aware there were positives
in the building. Virginia advised she will need time to contact the [facility] for
[facility]. KOLBY advised that [facility] will likely utilize patient's medicare
benefit first. KOLBY discussed possible discharge date of tomorrow with Virginia
who advised patient is under the impression he will be in the hospital another
week.

## 2022-06-22 VITALS — SYSTOLIC BLOOD PRESSURE: 155 MMHG | TEMPERATURE: 97.8 F | HEART RATE: 70 BPM | DIASTOLIC BLOOD PRESSURE: 70 MMHG

## 2022-06-22 VITALS — TEMPERATURE: 97.8 F | HEART RATE: 70 BPM | SYSTOLIC BLOOD PRESSURE: 170 MMHG | DIASTOLIC BLOOD PRESSURE: 81 MMHG

## 2022-06-22 VITALS — SYSTOLIC BLOOD PRESSURE: 158 MMHG | TEMPERATURE: 97.7 F | DIASTOLIC BLOOD PRESSURE: 80 MMHG | HEART RATE: 77 BPM

## 2022-06-22 VITALS — HEART RATE: 70 BPM | SYSTOLIC BLOOD PRESSURE: 168 MMHG | DIASTOLIC BLOOD PRESSURE: 54 MMHG | TEMPERATURE: 97.8 F

## 2022-06-22 VITALS — DIASTOLIC BLOOD PRESSURE: 68 MMHG | TEMPERATURE: 98.6 F | SYSTOLIC BLOOD PRESSURE: 137 MMHG | HEART RATE: 69 BPM

## 2022-06-22 VITALS — SYSTOLIC BLOOD PRESSURE: 165 MMHG | HEART RATE: 72 BPM | DIASTOLIC BLOOD PRESSURE: 88 MMHG | TEMPERATURE: 97.4 F

## 2022-06-22 LAB
ALBUMIN SERPL-MCNC: 2.5 GM/DL (ref 3.4–4.8)
ALP SERPL-CCNC: 58 U/L (ref 40–150)
ALT SERPL-CCNC: 69 U/L (ref 0–55)
ANION GAP SERPL CALC-SCNC: 10 MMOL/L (ref 7–16)
AST SERPL-CCNC: 52 U/L (ref 5–34)
BILIRUB SERPL-MCNC: 1 MG/DL (ref 0.2–1.2)
BUN SERPL-MCNC: 32 MG/DL (ref 8–26)
CALCIUM SERPL-MCNC: 8.4 MG/DL (ref 8.4–10.2)
CHLORIDE SERPL-SCNC: 103 MMOL/L (ref 98–107)
CO2 SERPL-SCNC: 25 MMOL/L (ref 23–31)
CREAT SERPL-SCNC: 0.85 MG/DL (ref 0.72–1.25)
ERYTHROCYTE [DISTWIDTH] IN BLOOD BY AUTOMATED COUNT: 13.7 % (ref 11.5–14.5)
GLUCOSE SERPL-MCNC: 115 MG/DL (ref 70–99)
HCT VFR BLD AUTO: 35.9 % (ref 42–52)
HGB BLD-MCNC: 12.4 G/DL (ref 13.5–18)
LYMPHOCYTES NFR BLD MANUAL: 11 % (ref 20–51)
MAGNESIUM SERPL-MCNC: 1.9 MG/DL (ref 1.6–2.6)
MCH RBC QN AUTO: 33 PG (ref 27–31)
MCHC RBC AUTO-ENTMCNC: 35 G/DL (ref 33–37)
MCV RBC AUTO: 96 FL (ref 80–100)
METAMYELOCYTES NFR BLD MANUAL: 1 % (ref 0–0)
MONOCYTES NFR BLD: 10 % (ref 1.7–9.3)
NEUTS BAND NFR BLD: 2 % (ref 0–10)
NEUTS SEG NFR BLD MANUAL: 76 % (ref 42–75.2)
PLATELET # BLD AUTO: 150 K/MM3 (ref 130–400)
PLATELET BLD QL SMEAR: NORMAL
PMV BLD AUTO: 9.8 FL (ref 7.4–10.4)
POTASSIUM SERPL-SCNC: 4 MMOL/L (ref 3.5–4.5)
PROT SERPL-MCNC: 5.6 GM/DL (ref 6.2–8.1)
RBC # BLD AUTO: 3.74 M/MM3 (ref 4.2–5.6)
SODIUM SERPL-SCNC: 138 MMOL/L (ref 136–145)

## 2022-06-22 NOTE — NUR
PATIENT DID NOT SLEEP THROUGHOUT THE NIGHT RESTLESS, CURRENTLY USING BR
AMBULATING WITH WALKER WILL ASSIST TO CHAIR. CONTINUING ON ABT THERAPY AND
ISOLATION PRECAUTIONS.

## 2022-06-22 NOTE — NUR
The clinical team is ready to discharge the patient. KOLBY met with the patient's
daughter, Virginia. Virginia is in agreement to the plan, but states that Dr. Grullon informed them the patient would be discharged on IV antibiotics. She
would like to know this. KOLBY presented and read the IM form outloud to
Virginia. Virginia verbalized understanding and signed the form. KOLBY provided
her with a copy.
 
Dr. Grullon, pulmonologist, then arrived to the hospital. Dr. Grullon is
recommending that the patient continue his IV antibotics: IV Zosyn 3 times a
day and IV Vanco twice a day upon discharge. If SNF cannot accomodate this,
Dr. Grullon is recommending [facility]. KOLBY notified Cory at
[facility] of the patient's antibiotic needs. Cory reports that they are able to
accomodate the antibiotics and would be ready to accept the patient today.
They can pick the patient up at 1430 today. KOLBY contacted and updated the
patient's daughter, Virginia, and wife.
 
The hospitalist and PA then informed KOLBY that with the patient's antibiotic
regimen, they would recommend [facility] now for the patient. KOLBY contacted and
updated the patient's daughter and wife. They are agreeable to [facility] in
[place]. KOLBY contacted and faxed a referral to Raymond at [facility]. Awaiting
screen.

## 2022-06-22 NOTE — NUR
Raymond, at [facility], reports that the patient meets criteria and they are able to
accept him. He states that he may have a bed tomorrow. He plans to contact the
patient's daughter, Virginia.

## 2022-06-22 NOTE — NUR
PATIENT UP IN BATHROOM NAKED REINFORCED TO WAIT FOR ASSISTANCE TO PREVENT
FALL. PATIENT UNABLE TO SLEEP THROUGHOUT SHIFT SO FAR ASKING FROM [facility] [employer]
AND [facility]. REINFORCED OF HYPERGLYCEMIA AND EDUCATED ON DIABETES. PATIENT ON
SIDE OF BED DRINKING FLUIDS. WITH BED ALARM IN PLACE WILL CONTINUE TO MONITOR

## 2022-06-22 NOTE — NUR
Assessment complete. A&Ox4. Denies pain/nausea. Short of breath with activity.
O2 currently @2L/NC. VS stable. BUE/BLE edema-+2. Right wrist INT flushes
well. SCDs bilat. Plan of care discussed for this shift to include
meds/calling for quesitons/concerns. Verbalizes understanding. Call light in
reach. Will monitor.

## 2022-06-22 NOTE — NUR
Raymond, at [facility], reports that they are able to accept the patient tomorrow
and that he has been in contact with the patient's daughter, Virginia. KOLBY
contacted [facility] at [facility] and secured a transport time around 1500.
KOLBY updated the PA and the patient's daughter, Virginia. The patient's wife was
also on the call. The patient and his wife are in agreement to the plan.

## 2022-06-22 NOTE — NUR
PATIENT REQUESTING PAIN MEDICATION AROUND 0130 PROVIDED DUE TO HEADACHE AND
GENERALIZED PAIN. REPOSITIONED IN BED HOB 45 DEGREES FOR COMFORT. CONTINUES ON
ISOLATION PRECUATIN FOR RHINOVIRUS. PENDING SPUTUM NEEDED INFORMED PATIENT.
WILL CONTINUE TO MONITOR FOR ANY CHANGES. REMAINS ON 02 @ 2L VIA NC.

## 2022-06-22 NOTE — NUR
Patient called out stating he was having chest pain. Noted to have elevated
blood pressures 170s/90s. Describes pain as pressure in mid chest. Spoke with
AUGUST Varma and new orders received and initiated. Patient states that the
pressure started after receiving news that he would be transferring to [facility] and he feels as if it stress/anxiety related. Ativan ordered and given at
this time. Will continue to monitor.

## 2022-06-23 VITALS — HEART RATE: 77 BPM | DIASTOLIC BLOOD PRESSURE: 79 MMHG | SYSTOLIC BLOOD PRESSURE: 132 MMHG | TEMPERATURE: 98 F

## 2022-06-23 VITALS — TEMPERATURE: 98 F | DIASTOLIC BLOOD PRESSURE: 78 MMHG | SYSTOLIC BLOOD PRESSURE: 157 MMHG | HEART RATE: 82 BPM

## 2022-06-23 VITALS — HEART RATE: 70 BPM | DIASTOLIC BLOOD PRESSURE: 77 MMHG | SYSTOLIC BLOOD PRESSURE: 151 MMHG | TEMPERATURE: 98 F

## 2022-06-23 VITALS — TEMPERATURE: 98 F | HEART RATE: 82 BPM | SYSTOLIC BLOOD PRESSURE: 150 MMHG | DIASTOLIC BLOOD PRESSURE: 90 MMHG

## 2022-06-23 LAB
ANION GAP SERPL CALC-SCNC: 12 MMOL/L (ref 7–16)
BUN SERPL-MCNC: 23 MG/DL (ref 8–26)
CALCIUM SERPL-MCNC: 8.4 MG/DL (ref 8.4–10.2)
CHLORIDE SERPL-SCNC: 102 MMOL/L (ref 98–107)
CO2 SERPL-SCNC: 24 MMOL/L (ref 23–31)
CREAT SERPL-SCNC: 0.8 MG/DL (ref 0.72–1.25)
EOSINOPHIL NFR BLD: 3 % (ref 0–4)
ERYTHROCYTE [DISTWIDTH] IN BLOOD BY AUTOMATED COUNT: 13.8 % (ref 11.5–14.5)
GLUCOSE SERPL-MCNC: 145 MG/DL (ref 70–99)
HCT VFR BLD AUTO: 37.6 % (ref 42–52)
HGB BLD-MCNC: 13 G/DL (ref 13.5–18)
LYMPHOCYTES NFR BLD MANUAL: 1 % (ref 20–51)
MCH RBC QN AUTO: 33 PG (ref 27–31)
MCHC RBC AUTO-ENTMCNC: 35 G/DL (ref 33–37)
MCV RBC AUTO: 96 FL (ref 80–100)
MONOCYTES NFR BLD: 3 % (ref 1.7–9.3)
NEUTS BAND NFR BLD: 5 % (ref 0–10)
NEUTS SEG NFR BLD MANUAL: 88 % (ref 42–75.2)
PLATELET # BLD AUTO: 156 K/MM3 (ref 130–400)
PLATELET BLD QL SMEAR: NORMAL
PMV BLD AUTO: 10.3 FL (ref 7.4–10.4)
POTASSIUM SERPL-SCNC: 4 MMOL/L (ref 3.5–4.5)
RBC # BLD AUTO: 3.9 M/MM3 (ref 4.2–5.6)
SODIUM SERPL-SCNC: 138 MMOL/L (ref 136–145)

## 2022-06-23 PROCEDURE — 02HV33Z INSERTION OF INFUSION DEVICE INTO SUPERIOR VENA CAVA, PERCUTANEOUS APPROACH: ICD-10-PCS

## 2022-06-23 NOTE — NUR
ASSESSMENT COMPLETE FOR THIS SHIFT. PT RESTING IN BED WATCHING THE NEWS. PT
DENIED GENERAL PAIN, CHEST PAIN, PALPITATIONS, SOB, N,V,D OR DIZZINESS. PT'S
HAD AN UNEVENTFUL NIGHT SO FAR. PT EXPRESSED NO OTHER NEEDS AT THIS TIME. CALL
LIGHT WITHIN REACH.

## 2022-06-23 NOTE — NUR
PT DISCAHRGED FROM UNIT WITH EMS TRANSPORTATION. THIS RN ATTEMPTED TO CALL
REPORT TO [facility] AT THIS TIME, LEFT A MESSAGE, WAITING ON A CALL
BACK.

## 2022-06-23 NOTE — NUR
PT SITTING IN BED. MORNING MEDICATIONS GIVEN. SHIFT ASSESSMENT COMPLETED. PT
CURRENTLY ON 2L VIA NC. DENIES ANY PAIN OR NEEDS AT THIS TIME. AMBULATING
AROUND ROOM WELL. PREPARING FOR DISCHARGE THIS AFTERNOON. WILL CONTINUE TO
MONITOR.

## 2022-06-23 NOTE — NUR
The patient is to discharge today, 6/23, to [facility] 234 beds at the [facility] in
[place]. Transportation was arranged around 1500, via [facility]. KOLBY
informed the patient's RN, daughter, and Raymond at [facility] of the time. The
patient's daughter, Virginia, also gave verbal consent to KOLBY for the EMS
Transfer. No additional needs at this time.

## 2022-07-26 ENCOUNTER — HOSPITAL ENCOUNTER (INPATIENT)
Dept: HOSPITAL 19 - COL.ER | Age: 87
LOS: 3 days | Discharge: NURSING, REHAB OR LONG-TERM CARE | DRG: 177 | End: 2022-07-29
Attending: INTERNAL MEDICINE | Admitting: INTERNAL MEDICINE
Payer: COMMERCIAL

## 2022-07-26 VITALS — SYSTOLIC BLOOD PRESSURE: 169 MMHG | HEART RATE: 70 BPM | TEMPERATURE: 97.7 F | DIASTOLIC BLOOD PRESSURE: 76 MMHG

## 2022-07-26 VITALS — HEART RATE: 69 BPM | SYSTOLIC BLOOD PRESSURE: 158 MMHG | TEMPERATURE: 97.4 F | DIASTOLIC BLOOD PRESSURE: 78 MMHG

## 2022-07-26 VITALS — WEIGHT: 166.01 LBS | BODY MASS INDEX: 25.16 KG/M2 | HEIGHT: 67.99 IN

## 2022-07-26 VITALS — HEART RATE: 71 BPM | TEMPERATURE: 97.7 F | DIASTOLIC BLOOD PRESSURE: 76 MMHG | SYSTOLIC BLOOD PRESSURE: 172 MMHG

## 2022-07-26 DIAGNOSIS — Z79.4: ICD-10-CM

## 2022-07-26 DIAGNOSIS — Z88.2: ICD-10-CM

## 2022-07-26 DIAGNOSIS — I25.10: ICD-10-CM

## 2022-07-26 DIAGNOSIS — J12.82: ICD-10-CM

## 2022-07-26 DIAGNOSIS — E11.65: ICD-10-CM

## 2022-07-26 DIAGNOSIS — E78.5: ICD-10-CM

## 2022-07-26 DIAGNOSIS — I11.0: ICD-10-CM

## 2022-07-26 DIAGNOSIS — D64.9: ICD-10-CM

## 2022-07-26 DIAGNOSIS — Z23: ICD-10-CM

## 2022-07-26 DIAGNOSIS — I27.20: ICD-10-CM

## 2022-07-26 DIAGNOSIS — Z79.82: ICD-10-CM

## 2022-07-26 DIAGNOSIS — I50.23: ICD-10-CM

## 2022-07-26 DIAGNOSIS — E11.649: ICD-10-CM

## 2022-07-26 DIAGNOSIS — Z95.0: ICD-10-CM

## 2022-07-26 DIAGNOSIS — Z88.8: ICD-10-CM

## 2022-07-26 DIAGNOSIS — I48.91: ICD-10-CM

## 2022-07-26 DIAGNOSIS — Z95.1: ICD-10-CM

## 2022-07-26 DIAGNOSIS — U07.1: Primary | ICD-10-CM

## 2022-07-26 LAB
ALBUMIN SERPL-MCNC: 2.6 GM/DL (ref 3.4–4.8)
ALP SERPL-CCNC: 102 U/L (ref 40–150)
ALT SERPL-CCNC: 195 U/L (ref 0–55)
ANION GAP SERPL CALC-SCNC: 12 MMOL/L (ref 7–16)
AST SERPL-CCNC: 159 U/L (ref 5–34)
BASOPHILS NFR BLD MANUAL: 1 % (ref 0–2)
BILIRUB SERPL-MCNC: 1.1 MG/DL (ref 0.2–1.2)
BUN SERPL-MCNC: 14 MG/DL (ref 8–26)
CALCIUM SERPL-MCNC: 8.2 MG/DL (ref 8.4–10.2)
CHLORIDE SERPL-SCNC: 104 MMOL/L (ref 98–107)
CO2 SERPL-SCNC: 25 MMOL/L (ref 23–31)
CREAT SERPL-SCNC: 0.86 MG/DL (ref 0.72–1.25)
ERYTHROCYTE [DISTWIDTH] IN BLOOD BY AUTOMATED COUNT: 15.6 % (ref 11.5–14.5)
GLUCOSE SERPL-MCNC: 139 MG/DL (ref 70–99)
HCT VFR BLD AUTO: 35.3 % (ref 42–52)
HGB BLD-MCNC: 11.7 G/DL (ref 13.5–18)
LYMPHOCYTES NFR BLD MANUAL: 4 % (ref 20–51)
MCH RBC QN AUTO: 32 PG (ref 27–31)
MCHC RBC AUTO-ENTMCNC: 33 G/DL (ref 33–37)
MCV RBC AUTO: 98 FL (ref 80–100)
METAMYELOCYTES NFR BLD MANUAL: 4 % (ref 0–0)
MICROCYTES BLD QL SMEAR: (no result)
MONOCYTES NFR BLD: 6 % (ref 1.7–9.3)
NEUTS BAND NFR BLD: 15 % (ref 0–10)
NEUTS SEG NFR BLD MANUAL: 70 % (ref 42–75.2)
OVALOCYTES BLD QL SMEAR: (no result)
PH UR STRIP.AUTO: 6 [PH] (ref 5–8)
PLATELET # BLD AUTO: 117 K/MM3 (ref 130–400)
PLATELET BLD QL SMEAR: NORMAL
PMV BLD AUTO: 10.5 FL (ref 7.4–10.4)
POTASSIUM SERPL-SCNC: 3.6 MMOL/L (ref 3.5–4.5)
PROT SERPL-MCNC: 5.8 GM/DL (ref 6.2–8.1)
RBC # BLD AUTO: 3.62 M/MM3 (ref 4.2–5.6)
RBC # UR: (no result) /HPF (ref 0–2)
SODIUM SERPL-SCNC: 141 MMOL/L (ref 136–145)
SP GR UR STRIP.AUTO: 1.01 (ref 1–1.03)
URN COLLECT METHOD CLASS: (no result)
UROBILINOGEN UR STRIP.AUTO-MCNC: >=4 MG/DL

## 2022-07-26 NOTE — NUR
PATIENT ARRIVED TO UNIT WITH NO ADVERSE EVENT. CHANGED AND ORIENTED TO ROOM,
NO COMPLAINTS OF PAIN. ASSESSMENT PERFORMED, DINNER ORDERD AND PATIENT RESTING
COMFORTABLY.

## 2022-07-27 VITALS — SYSTOLIC BLOOD PRESSURE: 144 MMHG | TEMPERATURE: 97.7 F | HEART RATE: 69 BPM | DIASTOLIC BLOOD PRESSURE: 76 MMHG

## 2022-07-27 VITALS — SYSTOLIC BLOOD PRESSURE: 108 MMHG | HEART RATE: 69 BPM | TEMPERATURE: 97.4 F | DIASTOLIC BLOOD PRESSURE: 67 MMHG

## 2022-07-27 VITALS — HEART RATE: 73 BPM | TEMPERATURE: 97.6 F | SYSTOLIC BLOOD PRESSURE: 153 MMHG | DIASTOLIC BLOOD PRESSURE: 80 MMHG

## 2022-07-27 VITALS — TEMPERATURE: 97.7 F | SYSTOLIC BLOOD PRESSURE: 122 MMHG | DIASTOLIC BLOOD PRESSURE: 78 MMHG | HEART RATE: 81 BPM

## 2022-07-27 VITALS — DIASTOLIC BLOOD PRESSURE: 83 MMHG | HEART RATE: 70 BPM | SYSTOLIC BLOOD PRESSURE: 124 MMHG | TEMPERATURE: 97.6 F

## 2022-07-27 VITALS — SYSTOLIC BLOOD PRESSURE: 114 MMHG | HEART RATE: 71 BPM | DIASTOLIC BLOOD PRESSURE: 59 MMHG | TEMPERATURE: 97.6 F

## 2022-07-27 LAB
ANION GAP SERPL CALC-SCNC: 12 MMOL/L (ref 7–16)
BUN SERPL-MCNC: 13 MG/DL (ref 8–26)
BURR CELLS BLD QL SMEAR: (no result)
CALCIUM SERPL-MCNC: 8.1 MG/DL (ref 8.4–10.2)
CHLORIDE SERPL-SCNC: 101 MMOL/L (ref 98–107)
CO2 SERPL-SCNC: 26 MMOL/L (ref 23–31)
CREAT SERPL-SCNC: 0.75 MG/DL (ref 0.72–1.25)
CRP SERPL-MCNC: 9.42 MG/DL (ref 0–0.5)
EOSINOPHIL NFR BLD: 1 % (ref 0–4)
ERYTHROCYTE [DISTWIDTH] IN BLOOD BY AUTOMATED COUNT: 15.4 % (ref 11.5–14.5)
GLUCOSE SERPL-MCNC: 167 MG/DL (ref 70–99)
HCT VFR BLD AUTO: 35.3 % (ref 42–52)
HGB BLD-MCNC: 11.9 G/DL (ref 13.5–18)
LYMPHOCYTES NFR BLD MANUAL: 4 % (ref 20–51)
MAGNESIUM SERPL-MCNC: 1.2 MG/DL (ref 1.6–2.6)
MCH RBC QN AUTO: 32 PG (ref 27–31)
MCHC RBC AUTO-ENTMCNC: 34 G/DL (ref 33–37)
MCV RBC AUTO: 95 FL (ref 80–100)
MONOCYTES NFR BLD: 1 % (ref 1.7–9.3)
NEUTS BAND NFR BLD: 8 % (ref 0–10)
NEUTS SEG NFR BLD MANUAL: 86 % (ref 42–75.2)
OVALOCYTES BLD QL SMEAR: (no result)
PLATELET # BLD AUTO: 118 K/MM3 (ref 130–400)
PLATELET BLD QL SMEAR: (no result)
PMV BLD AUTO: 10.6 FL (ref 7.4–10.4)
POTASSIUM SERPL-SCNC: 3.5 MMOL/L (ref 3.5–4.5)
RBC # BLD AUTO: 3.71 M/MM3 (ref 4.2–5.6)
SCHISTOCYTES BLD QL SMEAR: (no result)
SODIUM SERPL-SCNC: 139 MMOL/L (ref 136–145)

## 2022-07-27 NOTE — NUR
attempted to contact patient's wife, Cindy to discuss discharge
planning as patient is in isolation for covid. KOLBY left message. KOLBY then
contacted patient's daughter, Virginia (ph#[phone]) to discuss discharge
planning. Virginia advised patient was at [facility] then
discharge to [facility] on 07/18/22 and has been there since. Patient
sees Dr. Gracy Sims at the [facility] for primary care. Patient normally
uses a walker or wheelchair for ambulation. Virginia is unsure if patient has
DPOA-HC but if he does, she thinks it may be his newphew Jose Enrique. Laly
advised his wife, Cindy (legal next of kin) is having some trouble right now
and may not be easily accessible by phone. Virginia advised the plan is for
patient to return to [facility] at time of discharge. KOLBY contacted Ruby
at [facility] and faxed clinical updates. Ruby advised they can accept
patient back at time of discharge.
 
Discharge Plan: [facility]

## 2022-07-27 NOTE — NUR
pt on 2L O2 per NC, very sleepy and lethargic @HS, only able to take a few
2100 meds crushed in applesauce, awake and alert this am, meds given whole in
applesauce. calls for urinal as needed. BGM last noc 197, no SSI required.

## 2022-07-27 NOTE — NUR
ALERT AND OX3. WHILE EATING BREAKFAST FELT NAUSEATED. ZOFRAN GIVEN. TOOK 1
PILL AT A TIME, MED PASSED. CO FEELING DIZZY, ORTHO STAT BP TAKEN AND
REPORTED. 114/92 LYING, 127/78 SITTING, 83/53 STANDING. DOCTOR AWARE OF
CHANGES. DENIES COUGH. PT WORKING W PT. CALL LIGHT WI REACH.

## 2022-07-27 NOTE — NUR
PT HAD A UNEVENTFUL DAY. 2LITER, DESATS WHEN AMBULATING. COMMODE USED AT
BEDSIDE. UPDATED TED TODAY WHEN SHE CALLED. NEEDS MET.

## 2022-07-28 VITALS — HEART RATE: 78 BPM | DIASTOLIC BLOOD PRESSURE: 90 MMHG | SYSTOLIC BLOOD PRESSURE: 133 MMHG | TEMPERATURE: 97.7 F

## 2022-07-28 VITALS — TEMPERATURE: 97.9 F | DIASTOLIC BLOOD PRESSURE: 72 MMHG | HEART RATE: 71 BPM | SYSTOLIC BLOOD PRESSURE: 114 MMHG

## 2022-07-28 VITALS — HEART RATE: 72 BPM | TEMPERATURE: 97.7 F | DIASTOLIC BLOOD PRESSURE: 74 MMHG | SYSTOLIC BLOOD PRESSURE: 123 MMHG

## 2022-07-28 VITALS — HEART RATE: 70 BPM | DIASTOLIC BLOOD PRESSURE: 63 MMHG | SYSTOLIC BLOOD PRESSURE: 112 MMHG | TEMPERATURE: 97.2 F

## 2022-07-28 VITALS — TEMPERATURE: 97.3 F | HEART RATE: 76 BPM | DIASTOLIC BLOOD PRESSURE: 83 MMHG | SYSTOLIC BLOOD PRESSURE: 140 MMHG

## 2022-07-28 VITALS — SYSTOLIC BLOOD PRESSURE: 133 MMHG | HEART RATE: 75 BPM | DIASTOLIC BLOOD PRESSURE: 62 MMHG

## 2022-07-28 NOTE — NUR
Pt alert, but very forgetful. Pt often asks the same questions multiple times
in one period and at times the conversation does not make sense. Pt follows
commands and is calm and cooperative. Pt is currently on room air, satting
WNL. Pt has a productive cough, and often coughs after taking larger pills.
Sputum is a moderate amount a clear, with a yellow tint. Pt forgets
limitations, but has not attempted to get OOB without assistance. Pt is
continent and sometimes incontinent of urine. Continuing with PO antibx per
orders. Enforcing 1500 FR.  Shift assessment performed. Medications
administered per orders and education provided. VS stable. Pt afebrile. On
room air. FSBS 256. No significant skin issues noted. Fall precautions in
place. Bed alarm on, yellow gown on, yellow socks on. Pt does not report any
questions at this time. Bed is low and locked, call bell within reach. No new
concerns at this time.

## 2022-07-29 VITALS — SYSTOLIC BLOOD PRESSURE: 140 MMHG | DIASTOLIC BLOOD PRESSURE: 73 MMHG | TEMPERATURE: 97.7 F | HEART RATE: 75 BPM

## 2022-07-29 VITALS — SYSTOLIC BLOOD PRESSURE: 121 MMHG | TEMPERATURE: 97.9 F | DIASTOLIC BLOOD PRESSURE: 76 MMHG | HEART RATE: 76 BPM

## 2022-07-29 VITALS — TEMPERATURE: 97.2 F | DIASTOLIC BLOOD PRESSURE: 73 MMHG | SYSTOLIC BLOOD PRESSURE: 123 MMHG | HEART RATE: 69 BPM

## 2022-07-29 NOTE — NUR
No adverse events overnight. Pt alert, still forgetful. Denies chest pain/SOB.
Productive cough still continuing. On room air, satting WNL. VS stable.
Afebrile.  Pt continues to be continent/incontinent. Adequate urine output
overnight.  Telemetry monitor on. PO antibx administered per orders. Fall
precautions in place. Bed alarm on. Bed low and locked, call bell within
reach. Pt does not report any questions at this time, no new concerns at this
time.

## 2022-07-29 NOTE — NUR
Patient sitting up in the recliner, Alert, but confused and forgetful. VSS. IV
CDI. Denies pain and discomfort. Contact/droplet precautions in place. Call
light within reach. Chair alarm on. Monitor watching the patient

## 2022-07-29 NOTE — NUR
was notified that patient is ready for discharge back to
[facility] today. SW attempted to contact patient by room phone and was
unable to reach him. RN advised patient is aware he is going back to
[facility]. KOLBY contacted Ruby at [facility] and set transport time for
1300. KOLBY faxed discharge orders and notified Ruby that patient will need
oxygen at time of pickup. KOLBY contacted patient's daughter, [place] to notify
her of discharge. KOLBY reviewed IM form with patient's daughter over the phone
and she gave verbal consent as signature. KOLBY placed form in chart. KOLBY received
a phone call [facility] who requested [facility]. KOLBY advised
patient to discharge to [facility] today.
 
Discharge Plan: [facility]

## 2022-07-29 NOTE — NUR
Patient taken by [facility] transporter. IV removed, tip intact, gauze and
coban applied. Report called to the nurse at [facility]. Personal belongings
with the patient

## 2022-08-01 VITALS — OXYGEN SATURATION: 92 %

## 2022-08-01 VITALS — OXYGEN SATURATION: 91 %

## 2022-08-01 VITALS — OXYGEN SATURATION: 80 %

## 2022-08-02 VITALS — OXYGEN SATURATION: 91 %

## 2022-08-02 VITALS — OXYGEN SATURATION: 95 %

## 2022-08-02 VITALS — OXYGEN SATURATION: 97 %

## 2022-08-02 VITALS — OXYGEN SATURATION: 96 %

## 2022-08-02 VITALS — OXYGEN SATURATION: 98 %

## 2022-08-02 VITALS — OXYGEN SATURATION: 94 %

## 2022-08-02 VITALS — OXYGEN SATURATION: 93 %

## 2022-08-02 VITALS — OXYGEN SATURATION: 92 %

## 2022-08-02 VITALS — OXYGEN SATURATION: 99 %

## 2022-08-02 VITALS — OXYGEN SATURATION: 89 %

## 2022-08-02 VITALS — OXYGEN SATURATION: 100 %

## 2022-09-26 ENCOUNTER — HOSPITAL ENCOUNTER (EMERGENCY)
Dept: HOSPITAL 19 - COL.ER | Age: 87
Discharge: HOME | End: 2022-09-26
Attending: EMERGENCY MEDICINE
Payer: COMMERCIAL

## 2022-09-26 VITALS — WEIGHT: 170.42 LBS | BODY MASS INDEX: 25.83 KG/M2 | HEIGHT: 67.99 IN

## 2022-09-26 VITALS — TEMPERATURE: 99.3 F

## 2022-09-26 VITALS — HEART RATE: 70 BPM | SYSTOLIC BLOOD PRESSURE: 143 MMHG | DIASTOLIC BLOOD PRESSURE: 89 MMHG

## 2022-09-26 DIAGNOSIS — Z86.16: ICD-10-CM

## 2022-09-26 DIAGNOSIS — K40.90: Primary | ICD-10-CM

## 2022-10-29 ENCOUNTER — HOSPITAL ENCOUNTER (EMERGENCY)
Dept: HOSPITAL 19 - COL.ER | Age: 87
Discharge: HOME | End: 2022-10-29
Attending: EMERGENCY MEDICINE
Payer: MEDICARE

## 2022-10-29 VITALS — HEIGHT: 78 IN | WEIGHT: 194.45 LBS | BODY MASS INDEX: 22.5 KG/M2

## 2022-10-29 VITALS — TEMPERATURE: 96.2 F

## 2022-10-29 VITALS — HEART RATE: 70 BPM | SYSTOLIC BLOOD PRESSURE: 155 MMHG | DIASTOLIC BLOOD PRESSURE: 84 MMHG

## 2022-10-29 DIAGNOSIS — S00.03XA: ICD-10-CM

## 2022-10-29 DIAGNOSIS — Z86.16: ICD-10-CM

## 2022-10-29 DIAGNOSIS — I48.92: ICD-10-CM
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
## 2022-10-29 DIAGNOSIS — S03.41XA: ICD-10-CM

## 2022-10-29 DIAGNOSIS — S09.90XA: Primary | ICD-10-CM

## 2022-10-29 LAB
ANION GAP SERPL CALC-SCNC: 11 MMOL/L (ref 7–16)
BASOPHILS # BLD: 0.1 K/MM3 (ref 0–0.2)
BASOPHILS NFR BLD AUTO: 0.6 % (ref 0–2)
BUN SERPL-MCNC: 17 MG/DL (ref 8–26)
CALCIUM SERPL-MCNC: 9.6 MG/DL (ref 8.4–10.2)
CHLORIDE SERPL-SCNC: 103 MMOL/L (ref 98–107)
CO2 SERPL-SCNC: 24 MMOL/L (ref 23–31)
CREAT SERPL-SCNC: 0.79 MG/DL (ref 0.72–1.25)
EOSINOPHIL # BLD: 0.3 K/MM3 (ref 0–0.7)
EOSINOPHIL NFR BLD: 3.8 % (ref 0–4)
ERYTHROCYTE [DISTWIDTH] IN BLOOD BY AUTOMATED COUNT: 13.4 % (ref 11.5–14.5)
GLUCOSE SERPL-MCNC: 173 MG/DL (ref 70–99)
GRANULOCYTES # BLD AUTO: 69.8 % (ref 42.2–75.2)
HCT VFR BLD AUTO: 37.1 % (ref 42–52)
HGB BLD-MCNC: 12.7 G/DL (ref 13.5–18)
LYMPHOCYTES # BLD: 1.2 K/MM3 (ref 1.2–3.4)
LYMPHOCYTES NFR BLD: 14.5 % (ref 20–51)
MCH RBC QN AUTO: 32 PG (ref 27–31)
MCHC RBC AUTO-ENTMCNC: 34 G/DL (ref 33–37)
MCV RBC AUTO: 93 FL (ref 80–100)
MONOCYTES # BLD: 0.9 K/MM3 (ref 0.1–0.6)
MONOCYTES NFR BLD AUTO: 10.1 % (ref 1.7–9.3)
NEUTROPHILS # BLD: 6 K/MM3 (ref 1.4–6.5)
PLATELET # BLD AUTO: 174 K/MM3 (ref 130–400)
PMV BLD AUTO: 9.5 FL (ref 7.4–10.4)
POTASSIUM SERPL-SCNC: 3.5 MMOL/L (ref 3.5–4.5)
RBC # BLD AUTO: 4 M/MM3 (ref 4.2–5.6)
SODIUM SERPL-SCNC: 138 MMOL/L (ref 136–145)